# Patient Record
Sex: FEMALE | URBAN - METROPOLITAN AREA
[De-identification: names, ages, dates, MRNs, and addresses within clinical notes are randomized per-mention and may not be internally consistent; named-entity substitution may affect disease eponyms.]

---

## 2021-07-08 ENCOUNTER — OFFICE VISIT (OUTPATIENT)
Dept: URBAN - METROPOLITAN AREA CLINIC 51 | Facility: CLINIC | Age: 86
End: 2021-07-08
Payer: MEDICARE

## 2021-07-08 DIAGNOSIS — H04.123 TEAR FILM INSUFFICIENCY OF BILATERAL LACRIMAL GLANDS: ICD-10-CM

## 2021-07-08 DIAGNOSIS — H35.3132 NONEXUDATIVE MACULAR DEGENERATION, INTERMEDIATE DRY STAGE, BILATERAL: Primary | ICD-10-CM

## 2021-07-08 DIAGNOSIS — Z96.1 PRESENCE OF INTRAOCULAR LENS: ICD-10-CM

## 2021-07-08 DIAGNOSIS — H52.4 PRESBYOPIA: ICD-10-CM

## 2021-07-08 DIAGNOSIS — H43.313 VITREOUS MEMBRANES AND STRANDS, BILATERAL: ICD-10-CM

## 2021-07-08 PROCEDURE — 92134 CPTRZ OPH DX IMG PST SGM RTA: CPT | Performed by: OPTOMETRIST

## 2021-07-08 PROCEDURE — 92004 COMPRE OPH EXAM NEW PT 1/>: CPT | Performed by: OPTOMETRIST

## 2021-07-08 ASSESSMENT — KERATOMETRY
OD: 46.61
OS: 47.17

## 2021-07-08 ASSESSMENT — VISUAL ACUITY
OS: 20/30
OD: 20/40

## 2021-07-08 ASSESSMENT — INTRAOCULAR PRESSURE
OD: 16
OS: 16

## 2021-07-08 NOTE — IMPRESSION/PLAN
Impression: Nonexudative macular degeneration, intermediate dry stage, bilateral: H35.2474. Plan: Educated patient on condition and findings. OCT MAC taken today, which reveals drusen, no SRF OU. Continue PreserVision vitamins, recommend UV blocking sunglasses when outdoors sunglasses wear, quit/avoid smoking, and eat leafy green vegetables. RTC with any changes/worsening in vision. Monitor with MOCT/FUNDUS PHOTOS. 
Patient currently under the care of retina specialist Dr. 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

## 2021-07-08 NOTE — IMPRESSION/PLAN
Impression: Presbyopia: H52.4. Plan: Discussed SRx for optimal vision, and wishes to update SRx (would like progressive glasses so she can wear them all day). Release new SRx. Patient does a lot of near work and was inquiring about getting a SV near prescription vs. OTC reading glasses. Discussed with patient the need for bright/ good lighting when reading due to ARMD.
Patient was worried that she was color blind and that she has difficulty differentiating the color blue and black. Discussed the option to see [facility] for tint consult for better distinction in colors x artwork, but patient declined.

## 2021-07-08 NOTE — IMPRESSION/PLAN
Impression: Tear film insufficiency of bilateral lacrimal glands: H04.123. Plan: Recommend increase Refresh AT's to at least 2-3 times a day or more OU.